# Patient Record
Sex: FEMALE | Race: OTHER | NOT HISPANIC OR LATINO
[De-identification: names, ages, dates, MRNs, and addresses within clinical notes are randomized per-mention and may not be internally consistent; named-entity substitution may affect disease eponyms.]

---

## 2024-01-22 PROBLEM — Z00.00 ENCOUNTER FOR PREVENTIVE HEALTH EXAMINATION: Status: ACTIVE | Noted: 2024-01-22

## 2024-01-24 NOTE — HISTORY OF PRESENT ILLNESS
[de-identified] :   She had an abdominal sonogram on  01/23/2024 which  did not reveal  Umbilical hernia or Gallstones.   CBD is normal

## 2024-01-24 NOTE — DATA REVIEWED
[FreeTextEntry1] : 	 Exam requested by: [facility] [address] [place] [id number] SITE PERFORMED: [facility] Patient: MONICA COHN YOB: 1960 Phone: [phone] MRN: [id number] Acc: [id number] Date of Exam: 01-   EXAM:  ULTRASOUND ABDOMEN COMPLETE  HISTORY:  Female, 63 years-old with umbilical hernia  TECHNIQUE:  Using real-time ultrasonography with a high-resolution broadband phased-array curved transducer, multiplanar gray scale images were obtained and supplemented with color Doppler. Static images are provided for review.  COMPARISON:  None  FINDINGS:   Abdominal Aorta/IVC: No evidence of abdominal aortic aneurysm. Visualized portions of the IVC were patent.  Liver:  Normal in size, morphology and contour. The visualized portion of portal and hepatic veins are unremarkable. No intrahepatic biliary duct dilatation. The liver has normal echogenicity.  Gallbladder: Gallstones are not identified. The walls do not appear thickened.  Common Duct: Normal measuring 0.2 cm diameter at the shabnam hepatis.  Pancreas: Limited visualization due to overlying bowel. No masses are noted.  Kidneys: Normal in size, morphology and cortical echotexture. There is no suspicious renal lesion. No hydronephrosis, shadowing calculi or perinephric fluid. Right Kidney: 9.8 cm in length. Left Kidney:   10.0 cm in length. A 1.1 x 1 x 1.1 cm cyst is noted in the lower pole.  Spleen: Normal size, contour and echotexture measuring 7.3 cm in length.  An umbilical hernia is not identified on sonography.  IMPRESSION: Umbilical hernia is not identified on sonography which should be correlated clinically.   Thank you for the opportunity to participate in the care of this patient.     MALCOLM GREEN MD  - Electronically Signed: 01- 12:51 PM  Physician to Physician Direct Line is: [phone]

## 2024-01-25 ENCOUNTER — APPOINTMENT (OUTPATIENT)
Dept: SURGERY | Facility: CLINIC | Age: 64
End: 2024-01-25
Payer: MEDICARE

## 2024-01-25 VITALS
WEIGHT: 149 LBS | HEIGHT: 63 IN | OXYGEN SATURATION: 98 % | BODY MASS INDEX: 26.4 KG/M2 | DIASTOLIC BLOOD PRESSURE: 71 MMHG | HEART RATE: 76 BPM | SYSTOLIC BLOOD PRESSURE: 105 MMHG

## 2024-01-25 DIAGNOSIS — Z83.3 FAMILY HISTORY OF DIABETES MELLITUS: ICD-10-CM

## 2024-01-25 DIAGNOSIS — Z86.73 PERSONAL HISTORY OF TRANSIENT ISCHEMIC ATTACK (TIA), AND CEREBRAL INFARCTION W/OUT RESIDUAL DEFICITS: ICD-10-CM

## 2024-01-25 DIAGNOSIS — Z63.5 DISRUPTION OF FAMILY BY SEPARATION AND DIVORCE: ICD-10-CM

## 2024-01-25 DIAGNOSIS — Z78.9 OTHER SPECIFIED HEALTH STATUS: ICD-10-CM

## 2024-01-25 PROCEDURE — 99204 OFFICE O/P NEW MOD 45 MIN: CPT

## 2024-01-25 RX ORDER — LAMOTRIGINE 200 MG/1
200 TABLET ORAL
Refills: 0 | Status: ACTIVE | COMMUNITY

## 2024-01-25 RX ORDER — ATORVASTATIN CALCIUM 80 MG/1
TABLET, FILM COATED ORAL
Refills: 0 | Status: ACTIVE | COMMUNITY

## 2024-01-25 RX ORDER — CLOPIDOGREL 75 MG/1
75 TABLET, FILM COATED ORAL
Refills: 0 | Status: ACTIVE | COMMUNITY

## 2024-01-25 SDOH — SOCIAL STABILITY - SOCIAL INSECURITY: DISRUPTION OF FAMILY BY SEPARATION AND DIVORCE: Z63.5

## 2024-02-02 ENCOUNTER — OUTPATIENT (OUTPATIENT)
Dept: OUTPATIENT SERVICES | Facility: HOSPITAL | Age: 64
LOS: 1 days | End: 2024-02-02
Payer: MEDICARE

## 2024-02-02 VITALS
RESPIRATION RATE: 16 BRPM | HEART RATE: 68 BPM | TEMPERATURE: 98 F | DIASTOLIC BLOOD PRESSURE: 82 MMHG | WEIGHT: 147.93 LBS | SYSTOLIC BLOOD PRESSURE: 135 MMHG | OXYGEN SATURATION: 98 % | HEIGHT: 63 IN

## 2024-02-02 DIAGNOSIS — K43.2 INCISIONAL HERNIA WITHOUT OBSTRUCTION OR GANGRENE: ICD-10-CM

## 2024-02-02 DIAGNOSIS — Z01.818 ENCOUNTER FOR OTHER PREPROCEDURAL EXAMINATION: ICD-10-CM

## 2024-02-02 LAB
ALBUMIN SERPL ELPH-MCNC: 3.9 G/DL — SIGNIFICANT CHANGE UP (ref 3.5–5)
ALP SERPL-CCNC: 100 U/L — SIGNIFICANT CHANGE UP (ref 40–120)
ALT FLD-CCNC: 23 U/L DA — SIGNIFICANT CHANGE UP (ref 10–60)
ANION GAP SERPL CALC-SCNC: 6 MMOL/L — SIGNIFICANT CHANGE UP (ref 5–17)
APTT BLD: 30.2 SEC — SIGNIFICANT CHANGE UP (ref 24.5–35.6)
AST SERPL-CCNC: 13 U/L — SIGNIFICANT CHANGE UP (ref 10–40)
BILIRUB SERPL-MCNC: 0.3 MG/DL — SIGNIFICANT CHANGE UP (ref 0.2–1.2)
BLD GP AB SCN SERPL QL: SIGNIFICANT CHANGE UP
BUN SERPL-MCNC: 13 MG/DL — SIGNIFICANT CHANGE UP (ref 7–18)
CALCIUM SERPL-MCNC: 9.6 MG/DL — SIGNIFICANT CHANGE UP (ref 8.4–10.5)
CHLORIDE SERPL-SCNC: 108 MMOL/L — SIGNIFICANT CHANGE UP (ref 96–108)
CO2 SERPL-SCNC: 26 MMOL/L — SIGNIFICANT CHANGE UP (ref 22–31)
CREAT SERPL-MCNC: 0.68 MG/DL — SIGNIFICANT CHANGE UP (ref 0.5–1.3)
EGFR: 97 ML/MIN/1.73M2 — SIGNIFICANT CHANGE UP
GLUCOSE SERPL-MCNC: 114 MG/DL — HIGH (ref 70–99)
HCT VFR BLD CALC: 36.1 % — SIGNIFICANT CHANGE UP (ref 34.5–45)
HGB BLD-MCNC: 11.9 G/DL — SIGNIFICANT CHANGE UP (ref 11.5–15.5)
INR BLD: 1.2 RATIO — HIGH (ref 0.85–1.18)
MCHC RBC-ENTMCNC: 29.8 PG — SIGNIFICANT CHANGE UP (ref 27–34)
MCHC RBC-ENTMCNC: 33 GM/DL — SIGNIFICANT CHANGE UP (ref 32–36)
MCV RBC AUTO: 90.5 FL — SIGNIFICANT CHANGE UP (ref 80–100)
NRBC # BLD: 0 /100 WBCS — SIGNIFICANT CHANGE UP (ref 0–0)
PLATELET # BLD AUTO: 187 K/UL — SIGNIFICANT CHANGE UP (ref 150–400)
POTASSIUM SERPL-MCNC: 4.1 MMOL/L — SIGNIFICANT CHANGE UP (ref 3.5–5.3)
POTASSIUM SERPL-SCNC: 4.1 MMOL/L — SIGNIFICANT CHANGE UP (ref 3.5–5.3)
PROT SERPL-MCNC: 7.6 G/DL — SIGNIFICANT CHANGE UP (ref 6–8.3)
PROTHROM AB SERPL-ACNC: 13.6 SEC — HIGH (ref 9.5–13)
RBC # BLD: 3.99 M/UL — SIGNIFICANT CHANGE UP (ref 3.8–5.2)
RBC # FLD: 12.7 % — SIGNIFICANT CHANGE UP (ref 10.3–14.5)
SODIUM SERPL-SCNC: 140 MMOL/L — SIGNIFICANT CHANGE UP (ref 135–145)
WBC # BLD: 5.79 K/UL — SIGNIFICANT CHANGE UP (ref 3.8–10.5)
WBC # FLD AUTO: 5.79 K/UL — SIGNIFICANT CHANGE UP (ref 3.8–10.5)

## 2024-02-02 NOTE — H&P PST ADULT - PROBLEM SELECTOR PLAN 3
Follow up with Pulmonology fo repeating CT chest to monitor nodule and control your CPOD .Get last note from Pulmonology visit .

## 2024-02-02 NOTE — H&P PST ADULT - NSHP PST DIAGOTHER LIST_GEN_A_CORE
CT chest  ( right lung nodule monitored by Pulm), echo , nuclear stress test, T and S first specimen done at [facility], second to be done in am of sx

## 2024-02-02 NOTE — H&P PST ADULT - PRIMARY CARE PROVIDER
Charles He MD [phone], Pulmonology Michael Sorenson [phone], Neurology Nicole Ritchie [phone], Cardiology Jason Mitchell [phone]

## 2024-02-02 NOTE — H&P PST ADULT - NEGATIVE CARDIOVASCULAR SYMPTOMS
hx of hypertension ( currently no blood pressure medications ), CAD, cardiac catherization 2020/no chest pain/no palpitations/no dyspnea on exertion

## 2024-02-02 NOTE — H&P PST ADULT - PROBLEM SELECTOR PLAN 4
Instructed to continue pantoprazole 40 mg and take with sips of water on day of surgery. Follow-up with provider for management.

## 2024-02-02 NOTE — H&P PST ADULT - NSICDXPASTMEDICALHX_GEN_ALL_CORE_FT
PAST MEDICAL HISTORY:  CAD (coronary artery disease)     COPD, mild     GERD (gastroesophageal reflux disease)     H/O migraine     History of appendicitis     HLD (hyperlipidemia)     HTN (hypertension)     Hypertension     Nodule of right lung     Seizure     Shoulder pain, left     Stress incontinence     Uterine prolapse

## 2024-02-02 NOTE — H&P PST ADULT - NEGATIVE FEMALE-SPECIFIC SYMPTOMS
hx of uterine prolapse, hx of 2 c- sections ,Total abdominal hysterectomy./no irregular menses/no vaginal discharge/no dysmenorrhea/no abnormal vaginal bleeding

## 2024-02-02 NOTE — H&P PST ADULT - ASSESSMENT
Patient was diagnosed with incisional hernia without obstruction or gangrene .  STOP BANG score is 2, patient is at low  risk for RASHID. Pulmonary precautions to be maintained perioperatively. OR booking notified, Anesthesia to be notified

## 2024-02-02 NOTE — H&P PST ADULT - DOES PATIENT HAVE ADVANCE DIRECTIVE
ellie Woodruff call/No son Octavio Woodruff to be called in case of emergency to make medical decisions/No

## 2024-02-02 NOTE — H&P PST ADULT - PROBLEM SELECTOR PLAN 2
Take Lamictal as prescribed and take it also with few sip of water in am of sx. EEG from 2023 suggesting focal seizures, to get last visit note from neurology

## 2024-02-02 NOTE — H&P PST ADULT - PATIENT OPTIMIZED PENDING
MC, labs, ekg, last note from Cardiology 5/2023 , last note from neurology 5/2023  , last note from Pulmonology

## 2024-02-02 NOTE — H&P PST ADULT - PROBLEM SELECTOR PLAN 6
You have hypertension treated with diet, monitor your blood pressure before surgery, any symptoms of headache , blurry vision go to ER  .

## 2024-02-02 NOTE — H&P PST ADULT - PROBLEM SELECTOR PLAN 1
Patient is scheduled for laparoscopic incisional hernia repair possible open on 2/16/2024.  Preoperative instructions discussed and given to patient.  Discussed pre-procedural skin preparation using chlorhexidine gluconate 4% solution the morning of surgery prior to coming to hospital.  NPO after midnight.  Instructed to stop aspirin and over the counter medication including vitamins and herbal medications 7 days  prior to surgery unless otherwise instructed by your doctor or anesthesia.  Verbal and written instructions given to patient.  Patient verbalized understanding of instructions and is in agreement with the plan of care.  Type and screen first specimen done at [facility], second to be done in am of sx

## 2024-02-02 NOTE — H&P PST ADULT - NEGATIVE OPHTHALMOLOGIC SYMPTOMS
reading and distance glasses reading and distance glasses/no diplopia/no photophobia/no lacrimation L/no lacrimation R

## 2024-02-02 NOTE — H&P PST ADULT - PROBLEM SELECTOR PLAN 5
Stop Plavix 5 days before surgery and Aspirin 7 days of surgery since you do not have any coronary stents. Follow up with your Cardiology regarding management of your CAD. To get last note from Cardiology visit.

## 2024-02-02 NOTE — H&P PST ADULT - HISTORY OF PRESENT ILLNESS
63 years old female pmh of GERD, stress incontinence, seizure,  uterine prolapse, right  lung nodule, CAD, high cholesterol, psh cardiac catheretization 2020 - no PCI, appendicitis, COPD, left shoulder pain, migraines, high blood pressure, psh of left shoulder arthroscopy, appendectomy,  sections x 2, Total abdominal hysterectomy, bladder suspension sx, ovarian cystectomy presented with c/o of abdominal discomfort/ pain after abdominal surgeries. Patient was diagnosed with incisional hernia without obstruction or gangrene and patient is scheduled for laparoscopic incisional hernia repair possible open on 2024

## 2024-02-02 NOTE — H&P PST ADULT - NSICDXPASTSURGICALHX_GEN_ALL_CORE_FT
PAST SURGICAL HISTORY:  H/O cardiac catheterization     H/O  section     H/O ovarian cystectomy     History of appendectomy     History of bladder suspension procedure     S/P arthroscopy of left shoulder     S/P ABIGAIL (total abdominal hysterectomy)

## 2024-02-05 DIAGNOSIS — K43.2 INCISIONAL HERNIA WITHOUT OBSTRUCTION OR GANGRENE: ICD-10-CM

## 2024-02-05 DIAGNOSIS — Z90.49 ACQUIRED ABSENCE OF OTHER SPECIFIED PARTS OF DIGESTIVE TRACT: Chronic | ICD-10-CM

## 2024-02-05 DIAGNOSIS — Z98.890 OTHER SPECIFIED POSTPROCEDURAL STATES: Chronic | ICD-10-CM

## 2024-02-05 DIAGNOSIS — K21.9 GASTRO-ESOPHAGEAL REFLUX DISEASE WITHOUT ESOPHAGITIS: ICD-10-CM

## 2024-02-05 DIAGNOSIS — R91.1 SOLITARY PULMONARY NODULE: ICD-10-CM

## 2024-02-05 DIAGNOSIS — I25.10 ATHEROSCLEROTIC HEART DISEASE OF NATIVE CORONARY ARTERY WITHOUT ANGINA PECTORIS: ICD-10-CM

## 2024-02-05 DIAGNOSIS — Z90.710 ACQUIRED ABSENCE OF BOTH CERVIX AND UTERUS: Chronic | ICD-10-CM

## 2024-02-05 DIAGNOSIS — I10 ESSENTIAL (PRIMARY) HYPERTENSION: ICD-10-CM

## 2024-02-05 DIAGNOSIS — Z98.891 HISTORY OF UTERINE SCAR FROM PREVIOUS SURGERY: Chronic | ICD-10-CM

## 2024-02-05 DIAGNOSIS — Z87.898 PERSONAL HISTORY OF OTHER SPECIFIED CONDITIONS: ICD-10-CM

## 2024-02-05 PROCEDURE — G0463: CPT

## 2024-02-05 PROCEDURE — 86850 RBC ANTIBODY SCREEN: CPT

## 2024-02-05 PROCEDURE — 85610 PROTHROMBIN TIME: CPT

## 2024-02-05 PROCEDURE — 80053 COMPREHEN METABOLIC PANEL: CPT

## 2024-02-05 PROCEDURE — 86900 BLOOD TYPING SEROLOGIC ABO: CPT

## 2024-02-05 PROCEDURE — 85027 COMPLETE CBC AUTOMATED: CPT

## 2024-02-05 PROCEDURE — 36415 COLL VENOUS BLD VENIPUNCTURE: CPT

## 2024-02-05 PROCEDURE — T1013: CPT

## 2024-02-05 PROCEDURE — 86901 BLOOD TYPING SEROLOGIC RH(D): CPT

## 2024-02-05 PROCEDURE — 85730 THROMBOPLASTIN TIME PARTIAL: CPT

## 2024-02-15 ENCOUNTER — TRANSCRIPTION ENCOUNTER (OUTPATIENT)
Age: 64
End: 2024-02-15

## 2024-02-16 ENCOUNTER — APPOINTMENT (OUTPATIENT)
Dept: SURGERY | Facility: HOSPITAL | Age: 64
End: 2024-02-16
Payer: MEDICARE

## 2024-02-16 ENCOUNTER — OUTPATIENT (OUTPATIENT)
Dept: OUTPATIENT SERVICES | Facility: HOSPITAL | Age: 64
LOS: 1 days | End: 2024-02-16
Payer: MEDICARE

## 2024-02-16 ENCOUNTER — TRANSCRIPTION ENCOUNTER (OUTPATIENT)
Age: 64
End: 2024-02-16

## 2024-02-16 VITALS
TEMPERATURE: 98 F | SYSTOLIC BLOOD PRESSURE: 117 MMHG | OXYGEN SATURATION: 95 % | DIASTOLIC BLOOD PRESSURE: 59 MMHG | RESPIRATION RATE: 17 BRPM | HEART RATE: 84 BPM

## 2024-02-16 VITALS
WEIGHT: 147.93 LBS | HEIGHT: 63 IN | OXYGEN SATURATION: 99 % | TEMPERATURE: 98 F | RESPIRATION RATE: 15 BRPM | SYSTOLIC BLOOD PRESSURE: 100 MMHG | DIASTOLIC BLOOD PRESSURE: 64 MMHG | HEART RATE: 71 BPM

## 2024-02-16 DIAGNOSIS — Z98.891 HISTORY OF UTERINE SCAR FROM PREVIOUS SURGERY: Chronic | ICD-10-CM

## 2024-02-16 DIAGNOSIS — Z98.890 OTHER SPECIFIED POSTPROCEDURAL STATES: Chronic | ICD-10-CM

## 2024-02-16 DIAGNOSIS — Z90.49 ACQUIRED ABSENCE OF OTHER SPECIFIED PARTS OF DIGESTIVE TRACT: Chronic | ICD-10-CM

## 2024-02-16 DIAGNOSIS — Z90.710 ACQUIRED ABSENCE OF BOTH CERVIX AND UTERUS: Chronic | ICD-10-CM

## 2024-02-16 DIAGNOSIS — K43.2 INCISIONAL HERNIA WITHOUT OBSTRUCTION OR GANGRENE: ICD-10-CM

## 2024-02-16 LAB — BLD GP AB SCN SERPL QL: SIGNIFICANT CHANGE UP

## 2024-02-16 PROCEDURE — 36415 COLL VENOUS BLD VENIPUNCTURE: CPT

## 2024-02-16 PROCEDURE — 49591 RPR AA HRN 1ST < 3 CM RDC: CPT | Mod: AS

## 2024-02-16 PROCEDURE — 86900 BLOOD TYPING SEROLOGIC ABO: CPT

## 2024-02-16 PROCEDURE — 86901 BLOOD TYPING SEROLOGIC RH(D): CPT

## 2024-02-16 PROCEDURE — C1889: CPT

## 2024-02-16 PROCEDURE — 49591 RPR AA HRN 1ST < 3 CM RDC: CPT

## 2024-02-16 PROCEDURE — C1781: CPT

## 2024-02-16 PROCEDURE — 86850 RBC ANTIBODY SCREEN: CPT

## 2024-02-16 DEVICE — MESH HERNIA VENTRALIGHT ST CIRCLE 4.5IN: Type: IMPLANTABLE DEVICE | Site: LAPAROSCOPIC REPAIR OF INCIISONAL HERNIA WITH MESH | Status: FUNCTIONAL

## 2024-02-16 DEVICE — ETHICON HERNIA SECURESTRAP 5MM SIZE 25: Type: IMPLANTABLE DEVICE | Site: LAPAROSCOPIC REPAIR OF INCIISONAL HERNIA WITH MESH | Status: FUNCTIONAL

## 2024-02-16 RX ORDER — OXYCODONE HYDROCHLORIDE 5 MG/1
2.5 TABLET ORAL
Refills: 0 | Status: DISCONTINUED | OUTPATIENT
Start: 2024-02-16 | End: 2024-02-16

## 2024-02-16 RX ORDER — OXYCODONE HYDROCHLORIDE 5 MG/1
1 TABLET ORAL
Qty: 8 | Refills: 0
Start: 2024-02-16

## 2024-02-16 RX ORDER — KETOCONAZOLE 20 MG/G
1 AEROSOL, FOAM TOPICAL
Refills: 0 | DISCHARGE

## 2024-02-16 RX ORDER — GABAPENTIN 400 MG/1
1 CAPSULE ORAL
Refills: 0 | DISCHARGE

## 2024-02-16 RX ORDER — SODIUM CHLORIDE 9 MG/ML
1000 INJECTION, SOLUTION INTRAVENOUS
Refills: 0 | Status: DISCONTINUED | OUTPATIENT
Start: 2024-02-16 | End: 2024-02-16

## 2024-02-16 RX ORDER — HYDROMORPHONE HYDROCHLORIDE 2 MG/ML
0.5 INJECTION INTRAMUSCULAR; INTRAVENOUS; SUBCUTANEOUS
Refills: 0 | Status: DISCONTINUED | OUTPATIENT
Start: 2024-02-16 | End: 2024-02-16

## 2024-02-16 RX ORDER — SOD,AMMONIUM,POTASSIUM LACTATE
1 CREAM (GRAM) TOPICAL
Refills: 0 | DISCHARGE

## 2024-02-16 RX ORDER — LAMOTRIGINE 25 MG/1
1 TABLET, ORALLY DISINTEGRATING ORAL
Refills: 0 | DISCHARGE

## 2024-02-16 RX ORDER — CHOLECALCIFEROL (VITAMIN D3) 125 MCG
1 CAPSULE ORAL
Refills: 0 | DISCHARGE

## 2024-02-16 RX ORDER — UBROGEPANT 100 MG/1
1 TABLET ORAL
Refills: 0 | DISCHARGE

## 2024-02-16 RX ORDER — MOMETASONE FUROATE 1 MG/G
1 CREAM TOPICAL
Refills: 0 | DISCHARGE

## 2024-02-16 RX ORDER — PREGABALIN 225 MG/1
1 CAPSULE ORAL
Refills: 0 | DISCHARGE

## 2024-02-16 RX ORDER — ONDANSETRON 8 MG/1
4 TABLET, FILM COATED ORAL ONCE
Refills: 0 | Status: DISCONTINUED | OUTPATIENT
Start: 2024-02-16 | End: 2024-02-16

## 2024-02-16 RX ORDER — APREPITANT 80 MG/1
40 CAPSULE ORAL ONCE
Refills: 0 | Status: DISCONTINUED | OUTPATIENT
Start: 2024-02-16 | End: 2024-02-16

## 2024-02-16 RX ORDER — HYDROMORPHONE HYDROCHLORIDE 2 MG/ML
1 INJECTION INTRAMUSCULAR; INTRAVENOUS; SUBCUTANEOUS
Refills: 0 | Status: DISCONTINUED | OUTPATIENT
Start: 2024-02-16 | End: 2024-02-16

## 2024-02-16 RX ORDER — CLOPIDOGREL BISULFATE 75 MG/1
1 TABLET, FILM COATED ORAL
Refills: 0 | DISCHARGE

## 2024-02-16 RX ORDER — ATORVASTATIN CALCIUM 80 MG/1
1 TABLET, FILM COATED ORAL
Refills: 0 | DISCHARGE

## 2024-02-16 RX ORDER — ASPIRIN/CALCIUM CARB/MAGNESIUM 324 MG
1 TABLET ORAL
Refills: 0 | DISCHARGE

## 2024-02-16 RX ORDER — SODIUM CHLORIDE 9 MG/ML
3 INJECTION INTRAMUSCULAR; INTRAVENOUS; SUBCUTANEOUS EVERY 8 HOURS
Refills: 0 | Status: DISCONTINUED | OUTPATIENT
Start: 2024-02-16 | End: 2024-02-16

## 2024-02-16 RX ORDER — PANTOPRAZOLE SODIUM 20 MG/1
1 TABLET, DELAYED RELEASE ORAL
Refills: 0 | DISCHARGE

## 2024-02-16 RX ORDER — IBUPROFEN 200 MG
600 TABLET ORAL ONCE
Refills: 0 | Status: DISCONTINUED | OUTPATIENT
Start: 2024-02-16 | End: 2024-02-16

## 2024-02-16 RX ORDER — KETOROLAC TROMETHAMINE 30 MG/ML
15 SYRINGE (ML) INJECTION ONCE
Refills: 0 | Status: DISCONTINUED | OUTPATIENT
Start: 2024-02-16 | End: 2024-02-16

## 2024-02-16 RX ADMIN — HYDROMORPHONE HYDROCHLORIDE 0.5 MILLIGRAM(S): 2 INJECTION INTRAMUSCULAR; INTRAVENOUS; SUBCUTANEOUS at 12:05

## 2024-02-16 RX ADMIN — HYDROMORPHONE HYDROCHLORIDE 0.5 MILLIGRAM(S): 2 INJECTION INTRAMUSCULAR; INTRAVENOUS; SUBCUTANEOUS at 11:50

## 2024-02-16 RX ADMIN — HYDROMORPHONE HYDROCHLORIDE 0.5 MILLIGRAM(S): 2 INJECTION INTRAMUSCULAR; INTRAVENOUS; SUBCUTANEOUS at 11:40

## 2024-02-16 RX ADMIN — Medication 15 MILLIGRAM(S): at 15:00

## 2024-02-16 RX ADMIN — HYDROMORPHONE HYDROCHLORIDE 1 MILLIGRAM(S): 2 INJECTION INTRAMUSCULAR; INTRAVENOUS; SUBCUTANEOUS at 12:20

## 2024-02-16 RX ADMIN — Medication 15 MILLIGRAM(S): at 12:57

## 2024-02-16 RX ADMIN — HYDROMORPHONE HYDROCHLORIDE 1 MILLIGRAM(S): 2 INJECTION INTRAMUSCULAR; INTRAVENOUS; SUBCUTANEOUS at 13:50

## 2024-02-16 RX ADMIN — Medication 15 MILLIGRAM(S): at 12:08

## 2024-02-16 RX ADMIN — SODIUM CHLORIDE 3 MILLILITER(S): 9 INJECTION INTRAMUSCULAR; INTRAVENOUS; SUBCUTANEOUS at 08:48

## 2024-02-16 RX ADMIN — Medication 15 MILLIGRAM(S): at 14:58

## 2024-02-16 NOTE — ASU PATIENT PROFILE, ADULT - DATE/TIME OF ACCEPTANCE
02-Feb-2024 09:00
no abdominal pain, no bloating, no constipation, no diarrhea, no nausea and no vomiting.

## 2024-02-16 NOTE — BRIEF OPERATIVE NOTE - NSICDXBRIEFPROCEDURE_GEN_ALL_CORE_FT
PROCEDURES:  Laparoscopic repair of ventral hernia with component release 16-Feb-2024 11:38:50 WITH MESH Teresa Treviño

## 2024-02-16 NOTE — ASU DISCHARGE PLAN (ADULT/PEDIATRIC) - CARE PROVIDER_API CALL
Ricardo Godinez  Surgery  [address] [facility], [address]  [place] [address]  Phone: [phone]  Fax: [phone]  Follow Up Time:    Ricardo Godinez  Surgery  [address] [facility], [address]  [place] [address]  Phone: [phone]  Fax: [phone]  Follow Up Time: 2 weeks

## 2024-02-16 NOTE — ASU PATIENT PROFILE, ADULT - FALL HARM RISK - HARM RISK INTERVENTIONS

## 2024-02-20 PROBLEM — I10 ESSENTIAL (PRIMARY) HYPERTENSION: Chronic | Status: ACTIVE | Noted: 2024-02-05

## 2024-02-20 PROBLEM — Z87.19 PERSONAL HISTORY OF OTHER DISEASES OF THE DIGESTIVE SYSTEM: Chronic | Status: ACTIVE | Noted: 2024-02-05

## 2024-02-20 PROBLEM — E78.5 HYPERLIPIDEMIA, UNSPECIFIED: Chronic | Status: ACTIVE | Noted: 2024-02-05

## 2024-02-20 PROBLEM — N39.3 STRESS INCONTINENCE (FEMALE) (MALE): Chronic | Status: ACTIVE | Noted: 2024-02-05

## 2024-02-20 PROBLEM — Z86.69 PERSONAL HISTORY OF OTHER DISEASES OF THE NERVOUS SYSTEM AND SENSE ORGANS: Chronic | Status: ACTIVE | Noted: 2024-02-05

## 2024-02-20 PROBLEM — I25.10 ATHEROSCLEROTIC HEART DISEASE OF NATIVE CORONARY ARTERY WITHOUT ANGINA PECTORIS: Chronic | Status: ACTIVE | Noted: 2024-02-05

## 2024-02-20 PROBLEM — M25.512 PAIN IN LEFT SHOULDER: Chronic | Status: ACTIVE | Noted: 2024-02-05

## 2024-02-20 PROBLEM — R56.9 UNSPECIFIED CONVULSIONS: Chronic | Status: ACTIVE | Noted: 2024-02-05

## 2024-02-20 PROBLEM — J44.9 CHRONIC OBSTRUCTIVE PULMONARY DISEASE, UNSPECIFIED: Chronic | Status: ACTIVE | Noted: 2024-02-05

## 2024-02-20 PROBLEM — K21.9 GASTRO-ESOPHAGEAL REFLUX DISEASE WITHOUT ESOPHAGITIS: Chronic | Status: ACTIVE | Noted: 2024-02-05

## 2024-02-20 PROBLEM — R91.1 SOLITARY PULMONARY NODULE: Chronic | Status: ACTIVE | Noted: 2024-02-05

## 2024-02-20 PROBLEM — N81.4 UTEROVAGINAL PROLAPSE, UNSPECIFIED: Chronic | Status: ACTIVE | Noted: 2024-02-05

## 2024-02-21 ENCOUNTER — NON-APPOINTMENT (OUTPATIENT)
Age: 64
End: 2024-02-21

## 2024-03-04 ENCOUNTER — APPOINTMENT (OUTPATIENT)
Dept: SURGERY | Facility: CLINIC | Age: 64
End: 2024-03-04
Payer: MEDICARE

## 2024-03-04 PROCEDURE — 99213 OFFICE O/P EST LOW 20 MIN: CPT

## 2024-03-04 RX ORDER — IBUPROFEN 600 MG/1
600 TABLET, FILM COATED ORAL EVERY 6 HOURS
Qty: 60 | Refills: 0 | Status: ACTIVE | COMMUNITY
Start: 2024-03-04 | End: 1900-01-01

## 2024-03-04 NOTE — HISTORY OF PRESENT ILLNESS
[de-identified] : Ms. COHN  is s/p Laparoscopic repair of incisional hernia on 02/16/2024. Today  Ms. COHN presents with cc of having pain in the left lower quadrant. She offers no other complaints. patient reports no fever or  chills.  Her surgical incisions are healing well. No signs of inflammation, infection or exudate. patient reports good bowel movements and appetite.  she reports taking Tylenol once only.

## 2024-03-04 NOTE — PLAN
[FreeTextEntry1] : patient will follow up in 2- 3 weeks  or if needed. Warning signs, follow up, and restrictions were discussed with the patient.

## 2024-03-04 NOTE — ASSESSMENT
[FreeTextEntry1] : Ms. COHN is doing well, with excellent post-operative recovery. All surgical incisions are healing well and as expected. There is no evidence of infection or complication, and she is progressing as expected. Post-operative wound care, activity, restrictions and precautions reinforced. Patient instructed to refrain from any heavy lifting greater than 10-15 pounds for at least 4-6 weeks post-operatively. she was advised to take Ibuprofen 600 mg q 6 hours PRN for pain.  Patient's questions and concerns addressed to patient's satisfaction.

## 2024-03-04 NOTE — PHYSICAL EXAM
[Alert] : alert [Oriented to Person] : oriented to person [Oriented to Place] : oriented to place [Oriented to Time] : oriented to time [Calm] : calm [de-identified] : She  is alert, well-groomed, and in NAD   [de-identified] : anicteric.  Nasal mucosa pink, septum midline. Oral mucosa pink.  Tongue midline, Pharynx without exudates.   [de-identified] :  Surgical wounds are  healing well.   no signs of  inflammation or infection. no recurrence  [de-identified] : Neck supple. Trachea midline. Thyroid isthmus barely palpable, lobes not felt.

## 2024-03-22 NOTE — H&P PST ADULT - PROBLEM SELECTOR PROBLEM 1
What Type Of Note Output Would You Prefer (Optional)?: Standard Output Hpi Title: Evaluation of Skin Lesions Incisional hernia without obstruction or gangrene

## 2024-04-02 PROBLEM — K43.2 INCISIONAL HERNIA WITHOUT OBSTRUCTION OR GANGRENE: Status: ACTIVE | Noted: 2024-01-25

## 2024-04-04 ENCOUNTER — APPOINTMENT (OUTPATIENT)
Dept: SURGERY | Facility: CLINIC | Age: 64
End: 2024-04-04
Payer: MEDICARE

## 2024-04-04 DIAGNOSIS — K43.2 INCISIONAL HERNIA W/OUT OBSTRUCTION OR GANGRENE: ICD-10-CM

## 2024-04-04 PROCEDURE — 99213 OFFICE O/P EST LOW 20 MIN: CPT

## 2024-04-04 NOTE — PHYSICAL EXAM
[Alert] : alert [Oriented to Person] : oriented to person [Oriented to Place] : oriented to place [Oriented to Time] : oriented to time [Calm] : calm [de-identified] : She  is alert, well-groomed, and in NAD   [de-identified] : anicteric.  Nasal mucosa pink, septum midline. Oral mucosa pink.  Tongue midline, Pharynx without exudates.   [de-identified] : Neck supple. Trachea midline. Thyroid isthmus barely palpable, lobes not felt.   [de-identified] :  Surgical wounds are  healing well.   no signs of  inflammation or infection. no recurrence

## 2024-04-04 NOTE — ASSESSMENT
[FreeTextEntry1] : Ms. COHN is doing well, with excellent post-operative recovery. All surgical incisions  healed well and as expected. There is no evidence of  complication or recurrence, and she is progressing as expected. Patient can return to normal activity without restrictions.  Patient's questions and concerns addressed to patient's satisfaction.

## 2024-04-04 NOTE — HISTORY OF PRESENT ILLNESS
[de-identified] : Ms. COHN  is s/p Laparoscopic repair of incisional hernia on 02/16/2024. Today  Ms. COHN offers no complaints. patient reports no fever or  chills. patient reports occasional discomfort in the  left  periumbilical area .  Her surgical incisions are healing well. No signs of inflammation, infection or exudate. patient reports good bowel movements and appetite.

## 2024-04-04 NOTE — PLAN
[FreeTextEntry1] : Ms. COHN will follow up  if needed. Warning signs, follow up, and restrictions were discussed with the patient.

## 2024-04-19 NOTE — ASU PATIENT PROFILE, ADULT - PATIENT REPRESENTATIVE PHONE
Cardiologist service following for hypertensive urgency.  Hypertensive regimen was augmented however Coreg and lisinopril currently on hold due to soft blood pressures.  Cardiology service signed off   [phone]

## 2024-07-23 ENCOUNTER — NON-APPOINTMENT (OUTPATIENT)
Age: 64
End: 2024-07-23

## 2025-02-21 NOTE — H&P PST ADULT - WEIGHT IN KG
pt took olmesartan, triamterene-HCTZ, synthroid and pepcid at 7 am with a sip of water 67.1 pt took olmesartan, triamterene-HCTZ, synthroid, ramolazine and pepcid at 7 am with a sip of water

## (undated) DEVICE — VENODYNE/SCD SLEEVE CALF MEDIUM

## (undated) DEVICE — TROCAR COVIDIEN VERSAONE BLADED SMOOTH 5MM STANDARD

## (undated) DEVICE — WARMING BLANKET UPPER ADULT

## (undated) DEVICE — GLV 7.5 PROTEXIS (WHITE)

## (undated) DEVICE — TIP METZENBAUM SCISSOR MONOPOLAR ENDOCUT (ORANGE)

## (undated) DEVICE — TUBING STRYKER PNEUMOSURE HEATED RTP

## (undated) DEVICE — INSUFFLATION NDL COVIDIEN SURGINEEDLE VERESS 120MM

## (undated) DEVICE — D HELP - CLEARVIEW CLEARIFY SYSTEM

## (undated) DEVICE — MARKING PEN W RULER

## (undated) DEVICE — DRAPE IOBAN 13" X 13"

## (undated) DEVICE — FOR-ESU VALLEYLAB T7E15009DX: Type: DURABLE MEDICAL EQUIPMENT

## (undated) DEVICE — DRAPE LIGHT HANDLE COVER (BLUE)

## (undated) DEVICE — SOL INJ NS 0.9% 1000ML

## (undated) DEVICE — PACK GENERAL LAPAROSCOPY

## (undated) DEVICE — DRSG TAPE UMBILICAL COTTON 2" X 30 X 1/8"

## (undated) DEVICE — DRAPE TOWEL BLUE 17" X 24"

## (undated) DEVICE — TROCAR COVIDIEN VERSAONE BLADED SMOOTH 11MM STANDARD

## (undated) DEVICE — DRAPE 1/2 SHEET 40X57"

## (undated) DEVICE — TUBING STRYKEFLOW II SUCTION / IRRIGATOR

## (undated) DEVICE — SOL IRR POUR H2O 1500ML

## (undated) DEVICE — GLV 7 PROTEXIS (WHITE)

## (undated) DEVICE — DRSG MASTISOL

## (undated) DEVICE — SUT SURGIPRO 1 30" GS-21

## (undated) DEVICE — LIGASURE BLUNT TIP 37CM

## (undated) DEVICE — SUT POLYSORB 4-0 18" P-12 UNDYED

## (undated) DEVICE — SUT POLYSORB 0 30" GU-46

## (undated) DEVICE — SYR ASEPTO

## (undated) DEVICE — GOWN LG